# Patient Record
Sex: FEMALE | Race: WHITE | Employment: UNEMPLOYED | URBAN - METROPOLITAN AREA
[De-identification: names, ages, dates, MRNs, and addresses within clinical notes are randomized per-mention and may not be internally consistent; named-entity substitution may affect disease eponyms.]

---
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
## 2024-01-31 ENCOUNTER — HOSPITAL ENCOUNTER (EMERGENCY)
Age: 19
Discharge: HOME | End: 2024-01-31
Attending: EMERGENCY MEDICINE
Payer: COMMERCIAL

## 2024-01-31 ENCOUNTER — APPOINTMENT (OUTPATIENT)
Dept: GENERAL RADIOLOGY | Age: 19
End: 2024-01-31
Payer: COMMERCIAL

## 2024-01-31 VITALS
TEMPERATURE: 98.3 F | HEART RATE: 93 BPM | RESPIRATION RATE: 16 BRPM | SYSTOLIC BLOOD PRESSURE: 126 MMHG | BODY MASS INDEX: 23.37 KG/M2 | OXYGEN SATURATION: 98 % | DIASTOLIC BLOOD PRESSURE: 75 MMHG | WEIGHT: 127 LBS | HEIGHT: 62 IN

## 2024-01-31 DIAGNOSIS — S60.221A CONTUSION OF RIGHT HAND, INITIAL ENCOUNTER: Primary | ICD-10-CM

## 2024-01-31 PROCEDURE — 73130 X-RAY EXAM OF HAND: CPT

## 2024-01-31 PROCEDURE — 99283 EMERGENCY DEPT VISIT LOW MDM: CPT

## 2024-01-31 ASSESSMENT — PAIN DESCRIPTION - LOCATION: LOCATION: HAND

## 2024-01-31 ASSESSMENT — PAIN SCALES - GENERAL: PAINLEVEL_OUTOF10: 6

## 2024-01-31 ASSESSMENT — PAIN - FUNCTIONAL ASSESSMENT: PAIN_FUNCTIONAL_ASSESSMENT: 0-10

## 2024-01-31 ASSESSMENT — PAIN DESCRIPTION - ORIENTATION: ORIENTATION: RIGHT

## 2024-01-31 NOTE — DISCHARGE INSTRUCTIONS
Please go to [employer] or call [phone] to find a new provider.     Or use QR code below:

## 2024-01-31 NOTE — ED PROVIDER NOTES
Triage Chief Complaint:    Hand Pain (R sided x1 week. NKI. States it being broke before and cast was removed in December )    LINDA Moreau is a 18 y.o. female that presents for evaluation of right hand pain.  The patient had prior injury.  Patient reports that she has been bumping it over the last week.  Patient had not noticed any numbness tingling or weakness.  No swelling.  No skin changes.  Has not tried thing it for symptom management.  Feels otherwise well.  No other acute concerns today.    History from : Patient    Limitations to history : None    ROS:  10 systems reviewed and negative except as above.     History reviewed. No pertinent past medical history.  History reviewed. No pertinent surgical history.  History reviewed. No pertinent family history.  Social History     Socioeconomic History    Marital status: Single     Spouse name: Not on file    Number of children: Not on file    Years of education: Not on file    Highest education level: Not on file   Occupational History    Not on file   Tobacco Use    Smoking status: Never    Smokeless tobacco: Not on file   Vaping Use    Vaping Use: Every day   Substance and Sexual Activity    Alcohol use: No    Drug use: Not Currently     Frequency: 1.0 times per week     Types: Marijuana (Weed)    Sexual activity: Not on file   Other Topics Concern    Not on file   Social History Narrative    Not on file     Social Determinants of Health     Financial Resource Strain: Not on file   Food Insecurity: Not on file   Transportation Needs: Not on file   Physical Activity: Not on file   Stress: Not on file   Social Connections: Not on file   Intimate Partner Violence: Not on file   Housing Stability: Not on file     No current facility-administered medications for this encounter.     Current Outpatient Medications   Medication Sig Dispense Refill    diclofenac (VOLTAREN) 75 MG EC tablet Take 1 tablet by mouth 2 times daily as needed for Pain 20 tablet 0

## 2024-11-11 ENCOUNTER — HOSPITAL ENCOUNTER (OUTPATIENT)
Dept: PSYCHIATRY | Age: 19
Setting detail: THERAPIES SERIES
Discharge: HOME | End: 2024-11-11
Payer: COMMERCIAL

## 2024-11-11 PROCEDURE — 90791 PSYCH DIAGNOSTIC EVALUATION: CPT

## 2024-11-11 PROCEDURE — 80305 DRUG TEST PRSMV DIR OPT OBS: CPT

## 2024-11-11 ASSESSMENT — PATIENT HEALTH QUESTIONNAIRE - PHQ9
2. FEELING DOWN, DEPRESSED OR HOPELESS: NOT AT ALL
1. LITTLE INTEREST OR PLEASURE IN DOING THINGS: NOT AT ALL
SUM OF ALL RESPONSES TO PHQ QUESTIONS 1-9: 0
SUM OF ALL RESPONSES TO PHQ9 QUESTIONS 1 & 2: 0
SUM OF ALL RESPONSES TO PHQ QUESTIONS 1-9: 0

## 2024-11-11 ASSESSMENT — ANXIETY QUESTIONNAIRES
1. FEELING NERVOUS, ANXIOUS, OR ON EDGE: NOT AT ALL
2. NOT BEING ABLE TO STOP OR CONTROL WORRYING: NOT AT ALL
GAD7 TOTAL SCORE: 0
6. BECOMING EASILY ANNOYED OR IRRITABLE: NOT AT ALL
7. FEELING AFRAID AS IF SOMETHING AWFUL MIGHT HAPPEN: NOT AT ALL
IF YOU CHECKED OFF ANY PROBLEMS ON THIS QUESTIONNAIRE, HOW DIFFICULT HAVE THESE PROBLEMS MADE IT FOR YOU TO DO YOUR WORK, TAKE CARE OF THINGS AT HOME, OR GET ALONG WITH OTHER PEOPLE: NOT DIFFICULT AT ALL
3. WORRYING TOO MUCH ABOUT DIFFERENT THINGS: NOT AT ALL
5. BEING SO RESTLESS THAT IT IS HARD TO SIT STILL: NOT AT ALL
4. TROUBLE RELAXING: NOT AT ALL

## 2024-11-11 NOTE — PROGRESS NOTES
[facility]     PHYSICIAN ORDER  &  LABORATORY TESTING  &       CLINICAL DIAGNOSTIC SUMMARY             Location: [x] [place] [] [place]                   Patient Name: Roxana Moreau   : 2005     Case # :  [id number]  Therapist: INGA LiraIII    Diagnostic Summary  F10.99 Unspecified alcohol-related disorder  F12.10 Nondependent cannabis abuse-unspecified use    Alcohol- 18 Client reports she drank to get drunk 1 time a month for 6 months. She reports she would drink 4-5 shots of liquor. Client reports last use of alcohol May 2023 4-5 shots    Cannabis- 18 Client reports she smokes 1 blunt a day until she quit in 2024. Client reports last use of cannabis 2024 1 blunt    PROBLEM STATEMENT: Client is on probation for attempting to obstruct charge.         IDENTIFYING INFORMATION:  Roxana Moreau / 2005         Client is a 18 year old  female on probation for an attempting to obstruct charge. She found out in 2024 she had a warrent out for aggravated robbery, felonious assault and obstructing official business. She turned herself in March and spent 47 days in care home. After court all charges were dropped down to attempting to obstruct official business. Client is single, employed full time at [employer] and no kids. She is living with her aunt and uncle and their children. Client reports she is not on good terms with dad and has not talked to him in a year. She reports her relationship with mom is getting better. She has not lived with her mom since age 10 due to her mom getting a child endagering charge. At that time whe went and lived with dad.     2.  IMPAIRED CONTROL :     Cannabis- using longer than intended, craving    3. SOCIAL IMPAIRMENT:     NA       4. RISKY USE:      NA    5. PHARMACOLOGIC DEPENDENCE:      NA    6. OTHER FACTORS:    Client has family history both sides  Client doesn't have PCP  Client has strained relationship with

## 2024-11-11 NOTE — PROGRESS NOTES
[facility]        Individual  Progress Note    Location: [x] [place] [] [place]                   Patient Name: Roxana Moreau   : 2005     Case # :  [id number]  Therapist: CASSIE Lira        Objective/Service/Time: kept 1 hour assessment    S-Client is a 18 year old  female on probation for an attempting to obstruct charge. She found out in 2024 she had a warrent out for aggravated robbery, felonious assault and obstructing official business. She turned herself in March and spent 47 days in [facility]. After court all charges were dropped down to attempting to obstruct official business. Client is single, employed full time at [employer] and no kids. She is living with her aunt and uncle and their children. Client reports she is not on good terms with dad and has not talked to him in a year. She reports her relationship with mom is getting better. She has not lived with her mom since age 10 due to her mom getting a child endagering charge. At that time whe went and lived with dad.     O-Client was cooperative, her thought process was logical, her mood euthymic, her affect full and speech normal. Client denies any hallucinations or delusions. No HI/SI.    A-Completed intake paperwork, began assessment and administered initial UDS. Client met criteria for level 1 treatment. Client chose [facility].     P-Client will return 24 to complete assessment.         Electronically signed by CASSIE Lira on 2024 at 10:28 AM

## 2024-11-19 ENCOUNTER — HOSPITAL ENCOUNTER (OUTPATIENT)
Dept: PSYCHIATRY | Age: 19
Setting detail: THERAPIES SERIES
Discharge: HOME | End: 2024-11-19
Payer: COMMERCIAL

## 2024-11-19 PROCEDURE — 90834 PSYTX W PT 45 MINUTES: CPT

## 2024-11-19 NOTE — PROGRESS NOTES
[facility]      Location: [x] [place] [] [place]    Treatment plan: Initial    Strengths: singing and art     Weakness/Limitations: impulsive     Service/Frequency/Duration: Individual 1 a week for 90 days , Group assigned 1 a week for 90 days , Urinalysis Random monthly for 90 days  , AA/NA 1-2 Biweekly for 90 days , and Case Management as needed     Diagnosis: F12.10 Nondependent cannabis abuse-unspecified use and F10.99 Unspecified alcohol-related disorder    Level of Care: 1 Outpatient Services      Problem: History of Substance use resulting in an obstruction charge.     Goal: Client will enhance personalized knowledge and insight associated with mood altering substances in 90 days   Objectives:   1) Client will remind herself of 1-2 detrimental consequences in major life areas regarding AoD use in 90 days Evaluation Date: 2/19/2025 Code: C Continue TBD     2) Client will identify 4 to 8 benefits and gratitude's due to remaining substance free in 90 days: Evaluation Date: 2/19/2025 Code: C Continue TBD     3) Client will identify 3-5 people, places and situations that trigger alcohol use in 90 days and Evaluation Date: 2/19/2025 Code: C Continue TBD     2.    Problem: Low Self-Esteem/Identify issues as a result of her self-medicating.     Goal: Client will learn to focus on her character strengths and feel better about herself in 90 days      Objectives:   1) Client will identify 3-5 times weekly thoughts and feelings and share how this aids recovery in her individual sessions in 90 days: Evaluation Date: 2/19/2025 Code: C Continue TBD      2) Client will identify, challenge, and replace destructive, high risk self-talk with positive strength building self-talk in 90 days. Evaluation Date: 2/19/2025 Code: C Continue TBD      3)  Utilize, if needed case management services provided by [facility]a to enhance abstaining from substance use Evaluation Date: 2/19/2025 Code: C Continue TBD     4)

## 2024-11-19 NOTE — PROGRESS NOTES
[facility]        Individual  Progress Note    Location: [x] [place] [] [place]                   Patient Name: Roxana Moreau   : 2005     Case # :  [id number]  Therapist: CASSIE Lira        Objective/Service/Time: kept 1 hour individual     S-Client is a 18 year old  female on probation for an attempting to obstruct charge. She found out in 2024 she had a warrent out for aggravated robbery, felonious assault and obstructing official business. She turned herself in March and spent 47 days in long term. After court all charges were dropped down to attempting to obstruct official business. Client is single, employed full time at [employer] and no kids. She reports she is looking for a job in [place] currently. She is living with her aunt and uncle and their children. Client reports she is not on good terms with dad and has not talked to him in a year. She reports her relationship with mom is getting better. She has not lived with her mom since age 10 due to her mom getting a child endagering charge. At that time whe went and lived with dad.     O-Client was pleasant, cooperative and oriented x 4.     A-Completed assessment, reviewed last UDS that was negative for all substances and created a treatment plan. Client will start group tomorrow at 4:00 PM.    P-Continue services.       Electronically signed by CASSIE Lira on 2024 at 10:28 AM

## 2024-11-20 ENCOUNTER — HOSPITAL ENCOUNTER (OUTPATIENT)
Dept: PSYCHIATRY | Age: 19
Setting detail: THERAPIES SERIES
Discharge: HOME | End: 2024-11-20
Payer: COMMERCIAL

## 2024-11-20 PROCEDURE — 90853 GROUP PSYCHOTHERAPY: CPT

## 2024-11-20 NOTE — GROUP NOTE
[facility] Group Therapy Note      11/20/2024    Location:  [place]      Clients Presents: 1711, 1725, 1343, 1704, 1668    Clients Absent: 1726    Length of session: 1.5 hours    Group Note: OP    Group Type: Women's    New members were welcomed and introduced.  Norms and expectations of group were discussed.    Content: Counselor presented a topic focused discussion on \"chemical dependency symptoms\". Client answered several questions with a yes/no. Client identified symptoms of addiction and shared how her substance use progressed.    CASSIE Lira  11/20/2024 5:30 PM    Co-Therapist: N/A      [facility] Individual Group Progress Note    Roxana Moreau  2005 11/20/2024    Notes on Client Progress in Group    Client shared this is her first group and she is anxious. Client denies any use or cravings.   Client participated in group discussion on chemical dependency questionnaire and gave appropriate feedback to group members.    CASSIE Lira  11/20/2024 5:25 PM    Co-Therapist: N/A

## 2024-11-25 ENCOUNTER — HOSPITAL ENCOUNTER (OUTPATIENT)
Dept: PSYCHIATRY | Age: 19
Setting detail: THERAPIES SERIES
Discharge: HOME | End: 2024-11-25
Payer: COMMERCIAL

## 2024-11-25 PROCEDURE — 90832 PSYTX W PT 30 MINUTES: CPT

## 2024-11-25 NOTE — PROGRESS NOTES
Documentation:  I communicated with the patient and/or health care decision maker about progress in treatment .   Details of this discussion including any medical advice provided: coping skills to manage anxiety and sadness.     Total Time: minutes: 21-30 minutes    Roxana Moreau was evaluated through a synchronous (real-time) audio encounter. Patient identification was verified at the start of the visit. She (or guardian if applicable) is aware that this is a billable service, which includes applicable co-pays. This visit was conducted with the patient's (and/or legal guardian's) verbal consent. She has not had a related appointment within my department in the past 7 days or scheduled within the next 24 hours.   The patient was located at Home: [address] [place] [address].  The provider was located at Facility (Appt Dept): [facility] [address] [place] [address].  Confirm you are appropriately licensed, registered, or certified to deliver care in the state where the patient is located as indicated above. If you are not or unsure, please re-schedule the visit: Yes, I confirm.     Note: not billable if this call serves to triage the patient into an appointment for the relevant concern    Roxana Moreau is a 18 y.o. female evaluated via telephone on 11/25/2024 for No chief complaint on file.  .        Qing Escalante, [facility]

## 2024-11-25 NOTE — PROGRESS NOTES
[facility]        Individual  Progress Note    Location: [x] [place] [] [place]                   Patient Name: Roxana Moreau   : 2005     Case # :  [id number]  Therapist: CASSIE Lira        Objective/Service/Time: kept 1/2 hour telehealth session    Goal 1 Objective 2 continue  Goal 2 Objective 1 continue    S-Client is a 18 year old  female on probation. Client denies any use or cravings. She reports she thought her appointment was on Tuesday instead of Monday. She agreed to telehealth this morning. Client shared she has had 2 job interviews and is filling out applications online daily looking for a [employer] job. Client denies any current obstacles or stressors. She stated, \"I am nervous about working but I am ready. I am feeling anxious about seeing my family at [facility]. It will be good to see everyone that I haven't seen in a few years. She shared she is a little sad her brother is moving in a few days to [place].    O-Client was cooperative , pleasant and oriented x 4.     A-Client has some insight into her AoD use and consequences. She has small support network. Client has been encouraged to journal and build sober support.     P-Continue services.         Electronically signed by CASSIE Lira on 2024 at 10:21 AM

## 2024-11-25 NOTE — PROGRESS NOTES
[facility] TREATMENT PLAN      Location: [x] [place] [] [place]    Treatment plan: Initial    Strengths: singing and art     Weakness/Limitations: impulsive     Service/Frequency/Duration: Individual 1 a week for 90 days , Group assigned 1 a week for 90 days , Urinalysis Random monthly for 90 days  , AA/NA 1-2 Biweekly for 90 days , and Case Management as needed     Diagnosis: F12.10 Nondependent cannabis abuse-unspecified use and F10.99 Unspecified alcohol-related disorder    Level of Care: 1 Outpatient Services      Problem: History of Substance use resulting in an obstruction charge.     Goal: Client will enhance personalized knowledge and insight associated with mood altering substances in 90 days   Objectives:   1) Client will remind herself of 1-2 detrimental consequences in major life areas regarding AoD use in 90 days Evaluation Date: 2/19/2025 Code: C Continue TBD     2) Client will identify 4 to 8 benefits and gratitude's due to remaining substance free in 90 days: Evaluation Date: 2/19/2025 Code: Continue 11/25/2024 Client is grateful to be spending time with family this Holiday. She reports she hasn't seen everyone in a couple of years.     3) Client will identify 3-5 people, places and situations that trigger alcohol use in 90 days and Evaluation Date: 2/19/2025 Code: C Continue TBD     2.    Problem: Low Self-Esteem/Identify issues as a result of her self-medicating.     Goal: Client will learn to focus on her character strengths and feel better about herself in 90 days      Objectives:   1) Client will identify 3-5 times weekly thoughts and feelings and share how this aids recovery in her individual sessions in 90 days: Evaluation Date: 2/19/2025 Code: Continue 11/25/2024 She reports she has had 2 job interviews and has been filling out applications for a new job. Client is nervous but ready to work.     2) Client will identify, challenge, and replace destructive, high risk self-talk with positive

## 2024-11-27 ENCOUNTER — HOSPITAL ENCOUNTER (OUTPATIENT)
Dept: PSYCHIATRY | Age: 19
Setting detail: THERAPIES SERIES
Discharge: HOME | End: 2024-11-27
Payer: COMMERCIAL

## 2024-11-27 PROCEDURE — 90853 GROUP PSYCHOTHERAPY: CPT

## 2024-11-27 NOTE — GROUP NOTE
[facility] Group Therapy Note      11/27/2024    Location:  [place]      Clients Presents: 1704, 1725, 1668    Clients Absent: 1711, 1343    Length of session: 1.5 hours    Group Note: OP    Group Type: Women's    New members were welcomed and introduced.  Norms and expectations of group were discussed.    Content: Counselor presented a topic focused discussion on setting goals. Client participated in making a vision board with specific attainable goals.     INGA LiraIII  11/27/2024 5:30 PM    Co-Therapist: N/A      [facility] Individual Group Progress Note    Roxana Moreau  2005 11/27/2024    Notes on Client Progress in Group    Client shared she is making progress on her goals in treatment. She denies any use or cravings.   Client participated in group discussion on setting goals. Client participated in making her vision board.    INGA LiraIII  11/27/2024 5:21 PM    Co-Therapist: N/A

## 2024-12-02 ENCOUNTER — HOSPITAL ENCOUNTER (OUTPATIENT)
Dept: PSYCHIATRY | Age: 19
Setting detail: THERAPIES SERIES
Discharge: HOME | End: 2024-12-02
Payer: COMMERCIAL

## 2024-12-02 PROCEDURE — 80305 DRUG TEST PRSMV DIR OPT OBS: CPT

## 2024-12-02 PROCEDURE — 90834 PSYTX W PT 45 MINUTES: CPT
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
## 2024-12-04 ENCOUNTER — HOSPITAL ENCOUNTER (OUTPATIENT)
Dept: PSYCHIATRY | Age: 19
Setting detail: THERAPIES SERIES
Discharge: HOME | End: 2024-12-04
Payer: COMMERCIAL

## 2024-12-04 PROCEDURE — 90853 GROUP PSYCHOTHERAPY: CPT

## 2024-12-04 NOTE — GROUP NOTE
[facility] Group Therapy Note      12/4/2024    Location:  [place]      Clients Presents: 1704, 1725, 1711, 1668    Clients Absent: 1343, 1727    Length of session: 1.5 hours    Group Note: OP    Group Type: Women's    New members were welcomed and introduced.  Norms and expectations of group were discussed.    Content: Counselor presented a topic focused discussion on \"grieving the loss of addiction\".     INGA LiraIII  12/4/2024 5:30 PM    Co-Therapist: N/A      [facility] Individual Group Progress Note    Roxana Moreau  2005 12/4/2024    Notes on Client Progress in Group    Client shared she is making progress on her goals in treatment. She denies any use or cravings. She reports her boyfriends ex girl is causing issues and it is stressful.   Client participated in group discussion on grieving the loss of addiction. She offered peers appropriate feedback and support.     INGA LiraIII  12/4/2024 5:25 PM    Co-Therapist: N/A

## 2024-12-05 ENCOUNTER — HOSPITAL ENCOUNTER (EMERGENCY)
Age: 19
Discharge: HOME | End: 2024-12-05
Payer: COMMERCIAL

## 2024-12-05 VITALS
BODY MASS INDEX: 23 KG/M2 | TEMPERATURE: 98 F | HEART RATE: 92 BPM | DIASTOLIC BLOOD PRESSURE: 90 MMHG | WEIGHT: 125 LBS | HEIGHT: 62 IN | RESPIRATION RATE: 17 BRPM | OXYGEN SATURATION: 97 % | SYSTOLIC BLOOD PRESSURE: 138 MMHG

## 2024-12-05 DIAGNOSIS — R11.0 NAUSEA: ICD-10-CM

## 2024-12-05 DIAGNOSIS — Z32.02 PREGNANCY EXAMINATION OR TEST, NEGATIVE RESULT: Primary | ICD-10-CM

## 2024-12-05 LAB
BILIRUB UR QL STRIP: NEGATIVE
CLARITY UR: CLEAR
COLOR UR: YELLOW
COMMENT: NORMAL
GLUCOSE UR STRIP-MCNC: NEGATIVE MG/DL
HCG UR QL: NEGATIVE
HGB UR QL STRIP.AUTO: NEGATIVE
KETONES UR STRIP-MCNC: NEGATIVE MG/DL
LEUKOCYTE ESTERASE UR QL STRIP: NEGATIVE
NITRITE UR QL STRIP: NEGATIVE
PH UR STRIP: 5.5 [PH] (ref 5–8)
PROT UR STRIP-MCNC: NEGATIVE MG/DL
SP GR UR STRIP: 1.02 (ref 1–1.03)
UROBILINOGEN UR STRIP-ACNC: 0.2 EU/DL (ref 0–1)

## 2024-12-05 PROCEDURE — 81003 URINALYSIS AUTO W/O SCOPE: CPT

## 2024-12-05 PROCEDURE — 81025 URINE PREGNANCY TEST: CPT

## 2024-12-05 PROCEDURE — 84703 CHORIONIC GONADOTROPIN ASSAY: CPT

## 2024-12-05 PROCEDURE — 99283 EMERGENCY DEPT VISIT LOW MDM: CPT

## 2024-12-05 ASSESSMENT — PAIN SCALES - GENERAL: PAINLEVEL_OUTOF10: 5

## 2024-12-05 ASSESSMENT — PAIN DESCRIPTION - ORIENTATION: ORIENTATION: LOWER

## 2024-12-05 ASSESSMENT — PAIN - FUNCTIONAL ASSESSMENT: PAIN_FUNCTIONAL_ASSESSMENT: 0-10

## 2024-12-05 ASSESSMENT — LIFESTYLE VARIABLES
HOW OFTEN DO YOU HAVE A DRINK CONTAINING ALCOHOL: NEVER
HOW MANY STANDARD DRINKS CONTAINING ALCOHOL DO YOU HAVE ON A TYPICAL DAY: PATIENT DOES NOT DRINK

## 2024-12-05 ASSESSMENT — PAIN DESCRIPTION - DESCRIPTORS: DESCRIPTORS: ACHING

## 2024-12-05 ASSESSMENT — PAIN DESCRIPTION - LOCATION: LOCATION: ABDOMEN

## 2024-12-05 NOTE — DISCHARGE INSTRUCTIONS
It was my pleasure taking care of you in the emergency department today.  If we prescribed any medications, please be sure to take them as prescribed. Continue taking medications as prescribed by your PCP unless we discussed otherwise.   Please be sure to follow-up with your PCP within the next 1-2 weeks.  Please don't hesitate to return to the emergency department in case of any worsening symptoms.  Wish you a speedy recovery.  Most sincerely,    Maryana Echevarria CNP

## 2024-12-05 NOTE — ED PROVIDER NOTES
no right CVA tenderness, left CVA tenderness, guarding or rebound. Negative signs include Barney's sign, Rovsing's sign, McBurney's sign, psoas sign and obturator sign.      Hernia: No hernia is present.   Musculoskeletal:         General: Normal range of motion.      Cervical back: Normal range of motion. No rigidity.      Right lower leg: No edema.      Left lower leg: No edema.   Skin:     General: Skin is warm and dry.      Capillary Refill: Capillary refill takes less than 2 seconds.   Neurological:      General: No focal deficit present.      Mental Status: She is alert and oriented to person, place, and time.   Psychiatric:         Mood and Affect: Mood normal.         Behavior: Behavior normal.         Thought Content: Thought content normal.         Judgment: Judgment normal.           DIAGNOSTIC RESULTS   LABS:    Labs Reviewed   URINALYSIS WITH REFLEX TO CULTURE   PREGNANCY, URINE   CBC WITH AUTO DIFFERENTIAL   BASIC METABOLIC PANEL   LIPASE       When ordered only abnormal lab results are displayed. All other labs were within normal range or not returned as of this dictation.    EMERGENCY DEPARTMENT COURSE and DIFFERENTIAL DIAGNOSIS/MDM:   Vitals:    Vitals:    12/05/24 1111 12/05/24 1115   BP: (!) 138/90    Pulse: 92    Resp: 17    Temp: 98 °F (36.7 °C)    SpO2: 97%    Weight:  56.7 kg (125 lb)   Height:  1.575 m (5' 2\")       Patient was given the following medications:  Medications - No data to display        Chronic Conditions affecting care:    has no past medical history on file.    CONSULTS: (Who and What was discussed)  None      Records Reviewed (External and Source) seen on 11/3/2024 for STD exposure provided with Valtrex doxycycline patient was negative for trichomonas chlamydia gonorrhea herpes negative was also provided with Rocephin injection    CC/HPI Summary, DDx, ED Course, and Reassessment: See HPI and physical above    Patient presenting with concern for pregnancy, generalized

## 2024-12-05 NOTE — ED TRIAGE NOTES
Pt arrived to ED for abdominal pain that started a few days ago. Pt explained she had heavy spotting four days ago. Pt explained she wants a pregnancy test. Pt Is a/ox3 and ambulatory.

## 2024-12-06 LAB — HCG, PREGNANCY URINE (POC): NEGATIVE

## 2024-12-09 ENCOUNTER — HOSPITAL ENCOUNTER (OUTPATIENT)
Dept: PSYCHIATRY | Age: 19
Setting detail: THERAPIES SERIES
Discharge: HOME | End: 2024-12-09
Payer: COMMERCIAL

## 2024-12-09 PROCEDURE — 90832 PSYTX W PT 30 MINUTES: CPT
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
## 2024-12-09 NOTE — PROGRESS NOTES
[facility]        Individual  Progress Note    Location: [x] [place] [] [place]                   Patient Name: Roxana Moreau   : 2005     Case # :  [id number]  Therapist: CASSIE Lira        Objective/Service/Time: kept 1/2 hour telehealth    Goal 1 Objective 3 achieved    S-Client is a 18 year old  female on probation. Client denies any use or cravings. She reports she has been sick on and off and went to the ER 2024 and had blood taken and was there for 5 hours. She stated, \"I didn't get any answers. They actually lost my blood so after 5 hours they came back in and wanted to take it again and when I asked how long it would take to get results they said probably 5 hours? I left. I was tired and it was ridiculous\". Client and counselor explored triggers. She shared, old friends have been blocked, she stays away from apartment where she used to party at, she doesn't go to [place] due to trauma memories and she is triggered  by grief/loss.     O-Client was cooperative, worried AEB self report and oriented x 4. She shared openly about being worried about her health.    A-Client has good insight into her AoD use and triggers. She has very small support network. Client has been encouraged to attend women's meetings to build support. She is looking for a job.     P-Continue services, find employment and build sober support.         Electronically signed by CASSIE Lira on 2024 at 10:25 AM

## 2024-12-09 NOTE — PROGRESS NOTES
Documentation:  I communicated with the patient and/or health care decision maker about triggers.   Details of this discussion including any medical advice provided: see note    Total Time: minutes: 21-30 minutes    Roxana Moreau was evaluated through a synchronous (real-time) audio encounter. Patient identification was verified at the start of the visit. She (or guardian if applicable) is aware that this is a billable service, which includes applicable co-pays. This visit was conducted with the patient's (and/or legal guardian's) verbal consent. She has not had a related appointment within my department in the past 7 days or scheduled within the next 24 hours.   The patient was located at Home: [address] [place] [address].  The provider was located at Facility (Appt Dept): [place] [address].  Confirm you are appropriately licensed, registered, or certified to deliver care in the state where the patient is located as indicated above. If you are not or unsure, please re-schedule the visit: Yes, I confirm.     Note: not billable if this call serves to triage the patient into an appointment for the relevant concern    Roxana Moreau is a 18 y.o. female evaluated via telephone on 12/9/2024 for No chief complaint on file.  .        Qing Escalante, DCIII

## 2024-12-11 ENCOUNTER — HOSPITAL ENCOUNTER (OUTPATIENT)
Dept: PSYCHIATRY | Age: 19
Setting detail: THERAPIES SERIES
Discharge: HOME | End: 2024-12-11
Payer: COMMERCIAL

## 2024-12-11 PROCEDURE — 90853 GROUP PSYCHOTHERAPY: CPT

## 2024-12-11 NOTE — GROUP NOTE
[facility] Group Therapy Note      12/11/2024    Location:  [place]      Clients Presents: 1704, 1343, 1725, 1711, 1668    Clients Absent:     Length of session: 1.5 hours    Group Note: OP    Group Type: Women's    New members were welcomed and introduced.  Norms and expectations of group were discussed.    Content: Counselor facilitated an open discussion on being smart not strong. Client rated (1 being poor and 4 being excellent) her progress in 11 different areas of recovery.     CASSIE Lira  12/11/2024 5:30 PM    Co-Therapist: N/A      [facility] Individual Group Progress Note    Roxana Moreau  2005 12/11/2024    Notes on Client Progress in Group    Client shared she is making progress on her goals in treatment and is remaining sober. Client shared she is stressed about her relationship.   Client participated in group discussion reporting out of a possible 44 points she scored 40.     INGA LiraIII  12/11/2024 5:31 PM    Co-Therapist: N/A

## 2024-12-16 ENCOUNTER — HOSPITAL ENCOUNTER (OUTPATIENT)
Dept: PSYCHIATRY | Age: 19
Setting detail: THERAPIES SERIES
Discharge: HOME | End: 2024-12-16
Payer: COMMERCIAL

## 2024-12-16 NOTE — PROGRESS NOTES
[facility]        Individual  Progress Note    Location: [x] [place] [] [place]                   Patient Name: Roxana Moreau   : 2005     Case # :  [id number]  Therapist: CASSIE Lira        Objective/Service/Time:     Client did not show for her session. Counselor called client, no one answered. Client was sent a letter of intent.         Electronically signed by CASSIE Lira on 2024 at 10:10 AM

## 2024-12-17 ENCOUNTER — HOSPITAL ENCOUNTER (OUTPATIENT)
Dept: PSYCHIATRY | Age: 19
Setting detail: THERAPIES SERIES
Discharge: HOME | End: 2024-12-17
Payer: COMMERCIAL

## 2024-12-17 PROCEDURE — 90834 PSYTX W PT 45 MINUTES: CPT | Performed by: NURSE PRACTITIONER
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
## 2024-12-18 ENCOUNTER — HOSPITAL ENCOUNTER (OUTPATIENT)
Dept: PSYCHIATRY | Age: 19
Setting detail: THERAPIES SERIES
Discharge: HOME | End: 2024-12-18
Payer: COMMERCIAL

## 2024-12-18 PROCEDURE — 90853 GROUP PSYCHOTHERAPY: CPT

## 2024-12-19 NOTE — GROUP NOTE
[facility] Therapy Note      12/18/2024    Location:  [place]      Clients Presents: 1704, 1343, 1725, 1711, 1668    Clients Absent:     Length of session: 1.5 hours    Group Note: OP    Group Type: Women's    New members were welcomed and introduced.  Norms and expectations of group were discussed.    Content: Counselor facilitated an Addiction facts BINGO game. Client participated in playing BINGO and provided appropriate feedback and support to her peers.     CASSIE Lira  12/18/2024 5:30 PM    Co-Therapist: N/A      [facility] Individual Group Progress Note    Roxana Moreau  2005 12/19/2024    Notes on Client Progress in Group    Client shared she is making progress on her goals in treatment. She denies any use or cravings.   Client participated in playing Addiction BINGO and providing appropriate feedback and support to her peers.    CASSIE Lira  12/19/2024 8:58 AM    Co-Therapist: N/A

## 2024-12-23 ENCOUNTER — HOSPITAL ENCOUNTER (OUTPATIENT)
Dept: PSYCHIATRY | Age: 19
Setting detail: THERAPIES SERIES
Discharge: HOME | End: 2024-12-23
Payer: COMMERCIAL

## 2024-12-23 PROCEDURE — 90834 PSYTX W PT 45 MINUTES: CPT

## 2024-12-23 NOTE — PROGRESS NOTES
[facility]        Individual  Progress Note    Location: [x] [place] [] [place]                   Patient Name: Roxana Moreau   : 2005     Case # :  [id number]  Therapist: CASSIE Lira        Objective/Service/Time: kept 1 hour     Goal 1 Objective 2 continue  Goal 2 Objective 2 continue  Goal 2 Objective 4 achieved    S-Client is a 19 year old  female on probation. Client denies any use or cravings. She reports she is still looking for a job and is waiting to hear back from [employer]. Client denies any current obstacles or stressors. She reports she is grateful for her birthday today, her home and to not be where she was a year ago. Client shared she is still saying positive affirmations daily and feeling \"stronger\". She shared she has set boundaries with her uncle, Robyn and herself.     O-Client was pleasant, cooperative and oriented x 4.    A-Client has good insight into her AoD use and consequences. She is in the action stage of change. She has small family support. Counselor has encouraged 12 step meetings to build support.     P-Continue services and build support.             Electronically signed by CASSIE Lira on 2024 at 10:55 AM   
and share how this aids recovery in her individual sessions in 90 days: Evaluation Date: 2/19/2025 Code: Continue 11/25/2024 She reports she has had 2 job interviews and has been filling out applications for a new job. Client is nervous but ready to work.     2) Client will identify, challenge, and replace destructive, high risk self-talk with positive strength building self-talk in 90 days. Evaluation Date: 2/19/2025 Code: Continue 12/23/2024 Client reports saying, \"I am worthy, smart and courageous.      3)  Utilize, if needed case management services provided by [employer] to enhance abstaining from substance use Evaluation Date: 2/19/2025 Code: C Continue TBD     4) Client will identify people she needs to set healthy boundaries with and practice saying no, 1 time a week in 90 days. Evaluation Date: 2/19/2025 Code: Achieved 12/23/2024 Client reports she has set boundaries with her uncle, her friend Robyn and herself.           3.    Problem: History of Relapse resulting in emotional upset with self and family relationships.     Goal: Client will identify and address the core dynamics and dilemmas that are perpetuating consequences and exacerbate relapses and triggers and in 90 days      b.  Objectives:   1)  Client will identify 3-5 sober support person to contact weekly to share her thoughts and feelings to avoid relapse in 90 days Evaluation Date: 2/19/2025 Code: C Continue TBD     2) Client will enhance 4 to 8 healthy techniques and coping skills, relapse prevention in 90 days Evaluation Date: 2/19/2025 Code: C Continue TBD    3) Identify 4-5 situations that produce anxiety associated with substance use weekly, in 90 days and Evaluation Date: 2/19/2025 Code: C Continue TBD    Defer: Client reports she would like to have her own place.     Discharge Plan/Instructions: Client will remain abstinent from all mood altering substaces and complete her goals and objectives.     Roxana Moreau / 2005 has

## 2024-12-31 ENCOUNTER — HOSPITAL ENCOUNTER (OUTPATIENT)
Dept: PSYCHIATRY | Age: 19
Setting detail: THERAPIES SERIES
Discharge: HOME | End: 2024-12-31
Payer: COMMERCIAL

## 2024-12-31 PROCEDURE — 80305 DRUG TEST PRSMV DIR OPT OBS: CPT

## 2024-12-31 PROCEDURE — 90834 PSYTX W PT 45 MINUTES: CPT

## 2024-12-31 NOTE — PROGRESS NOTES
[facility]        Individual  Progress Note    Location: [x] [place] [] [place]                   Patient Name: Roxana Moreau   : 2005     Case # :  [id number]  Therapist: CASSIE Lira        Objective/Service/Time: kept 1 hour     Goal 2 Objective 1 continue  Goal 3 Objective 1 continue    S-Client is a 19 year old  female on probation. Client denies any use or cravings. Client shared she is a little tired today reporting she did not sleep well last night. Counselor explored sober support. Client stated, \"If I need to talk I go to my grandma. She is always there for me\". Client shared she had a good [place] and had fun giving her niece her makeup set. Client shared she is still looking for work and working on self care skills. Client denies any current stressors.     O-Client was cooperative, pleasant and oriented x 4.     A-Client has some insight into her AoD use and consequences. She is in the preparation stage of change. Client has small sober support network. Counselor encouraged client to journal more.    P-Continue services.         Electronically signed by CASSIE Lira on 2024 at 11:32 AM   
goals and objectives.     Roxana Moreau / 2005 has participated in the treatment plan development outlined above on 12/31/2024.     Qing Escalante LCDCIII  12/31/2024/11:19 AM

## 2025-01-01 ENCOUNTER — APPOINTMENT (OUTPATIENT)
Dept: PSYCHIATRY | Age: 20
End: 2025-01-01
Payer: COMMERCIAL

## 2025-01-08 ENCOUNTER — HOSPITAL ENCOUNTER (OUTPATIENT)
Dept: PSYCHIATRY | Age: 20
Setting detail: THERAPIES SERIES
Discharge: HOME | End: 2025-01-08
Payer: COMMERCIAL

## 2025-01-08 PROCEDURE — 90853 GROUP PSYCHOTHERAPY: CPT

## 2025-01-08 NOTE — GROUP NOTE
[facility] Group Therapy Note      1/8/2025    Location:  [place]      Clients Presents: 1711, 1704, 1343, 1725    Clients Absent: 1741    Length of session: 1.5 hours    Group Note: OP    Group Type: Women's    New members were welcomed and introduced.  Norms and expectations of group were discussed.    Content: Counselor presented a topic focused discussion on Defense Mechanisms.     INGA LiraIII  1/8/2025 5:30 PM    Co-Therapist: N/A      [facility] Individual Group Progress Note    Roxana Moreau  2005 1/8/2025    Notes on Client Progress in Group    Client shared she is making progress on her goals in treatment. She denies any use or cravings.   Client participated in group discussion on defense mechanisms. She reports she uses magnification and shared journaling is helping her with this.      INGA LiraIII  1/8/2025 5:35 PM    Co-Therapist: N/A

## 2025-01-13 ENCOUNTER — HOSPITAL ENCOUNTER (OUTPATIENT)
Dept: PSYCHIATRY | Age: 20
Setting detail: THERAPIES SERIES
Discharge: HOME | End: 2025-01-13
Payer: COMMERCIAL

## 2025-01-13 PROCEDURE — 90834 PSYTX W PT 45 MINUTES: CPT

## 2025-01-13 NOTE — PROGRESS NOTES
[facility]        Individual  Progress Note    Location: [x] [place] [] [place]                   Patient Name: Roxana Moreau   : 2005     Case # :  [id number]  Therapist: CASSIE Lira        Objective/Service/Time: kept 1 hour     Goal 3 Objective 2 continue    S-Client is a 19 year old  female on probation. Client denies any use or cravings. She shared she is still looking for a job. She plans on going to [employer] and [employer] today. Client shared her coping skills to manage frustration are writing in her journal, listening to music and taking hot showers. Client denies any current stressors.     O-Client was pleasant, cooperative and oriented x 4.     A-Client has good insight into her AoD use and consequences. She has small support network and is willing to build more. Client is in the action stage of change. Counselor has encouraged 12 step meetings.     P-Continue services.         Electronically signed by CASSIE Lira on 2025 at 10:41 AM   
and share how this aids recovery in her individual sessions in 90 days: Evaluation Date: 2/19/2025 Code: Continue 12/31/2024 She reports she is seeing a change in her \"mindset\". She reports she is learning to process her emotions and not shut down.      2) Client will identify, challenge, and replace destructive, high risk self-talk with positive strength building self-talk in 90 days. Evaluation Date: 2/19/2025 Code: Continue 12/23/2024 Client reports saying, \"I am worthy, smart and courageous.      3)  Utilize, if needed case management services provided by [email or web address] to enhance abstaining from substance use Evaluation Date: 2/19/2025 Code: C Continue TBD     4) Client will identify people she needs to set healthy boundaries with and practice saying no, 1 time a week in 90 days. Evaluation Date: 2/19/2025 Code: Achieved 12/23/2024 Client reports she has set boundaries with her uncle, her friend Robyn and herself.           3.    Problem: History of Relapse resulting in emotional upset with self and family relationships.     Goal: Client will identify and address the core dynamics and dilemmas that are perpetuating consequences and exacerbate relapses and triggers and in 90 days      b.  Objectives:   1)  Client will identify 3-5 sober support person to contact weekly to share her thoughts and feelings to avoid relapse in 90 days Evaluation Date: 2/19/2025 Code: Continue 12/31/2024 Client reports her grandma is her main sober support person.      2) Client will enhance 4 to 8 healthy techniques and coping skills, relapse prevention in 90 days Evaluation Date: 2/19/2025 Code: Continue 1/13/2025 Client listens to music, writes in her journal and takes hot showers.     3) Identify 4-5 situations that produce anxiety associated with substance use weekly, in 90 days and Evaluation Date: 2/19/2025 Code: C Continue TBD    Defer: Client reports she would like to have her own place.     Discharge Plan/Instructions:

## 2025-01-15 ENCOUNTER — HOSPITAL ENCOUNTER (OUTPATIENT)
Dept: PSYCHIATRY | Age: 20
Setting detail: THERAPIES SERIES
Discharge: HOME | End: 2025-01-15
Payer: COMMERCIAL

## 2025-01-15 PROCEDURE — 90853 GROUP PSYCHOTHERAPY: CPT

## 2025-01-15 NOTE — GROUP NOTE
[facility] Group Therapy Note      1/15/2025    Location:  [place]      Clients Presents: 1704, 1741, 1343, 1725, 1711    Clients Absent:     Length of session: 1.5 hours    Group Note: OP    Group Type: Women's    New members were welcomed and introduced.  Norms and expectations of group were discussed.    Content: Counselor presented a topic focused discussion on substance abuse. Client edi a question about substance abuse and recovery from the bowl and answered it.    CASSIE Lira  1/15/2025 5:30 PM    Co-Therapist: N/A      [facility] Individual Group Progress Note    Roxana Moreau  2005  1/15/2025    Notes on Client Progress in Group    Client reports she is making progress on her goals in treatment. She denies any use or cravings.   Client participated in group discussion by drawing a question from the bowl and answering it. She gave feedback and support to her peers.      INGA LiraIII  1/15/2025 5:31 PM    Co-Therapist: N/A

## 2025-01-20 ENCOUNTER — HOSPITAL ENCOUNTER (OUTPATIENT)
Dept: PSYCHIATRY | Age: 20
Setting detail: THERAPIES SERIES
Discharge: HOME | End: 2025-01-20
Payer: COMMERCIAL

## 2025-01-20 PROCEDURE — 90834 PSYTX W PT 45 MINUTES: CPT
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
## 2025-01-22 ENCOUNTER — HOSPITAL ENCOUNTER (OUTPATIENT)
Dept: PSYCHIATRY | Age: 20
Setting detail: THERAPIES SERIES
Discharge: HOME | End: 2025-01-22
Payer: COMMERCIAL

## 2025-01-22 PROCEDURE — 90853 GROUP PSYCHOTHERAPY: CPT

## 2025-01-22 NOTE — GROUP NOTE
[facility] Group Therapy Note      1/22/2025    Location:  [place]      Clients Presents: 1704, 1343, 1725, 1711    Clients Absent: 1741    Length of session: 1.5 hours    Group Note: OP    Group Type: Women's    New members were welcomed and introduced.  Norms and expectations of group were discussed.    Content: Counselor presented a topic focused discussion on AoD autobiography's. Client wrote her autobiography and shared with her peers.     INGA LiraIII  1/22/2025 5:30 PM    Co-Therapist: N/A      [facility] Individual Group Progress Note    Roxana Moreau  2005 1/22/2025    Notes on Client Progress in Group    Client shared she is making progress on her treatment goals. She reports she has filled out several applications and is hoping to hear from someone.   Client participated in writing her AoD timeline. She shared it with her peers and offered her peers feedback and support.     INGA LiraIII  1/22/2025 5:30 PM    Co-Therapist: N/A

## 2025-01-27 ENCOUNTER — HOSPITAL ENCOUNTER (OUTPATIENT)
Dept: PSYCHIATRY | Age: 20
Setting detail: THERAPIES SERIES
Discharge: HOME | End: 2025-01-27
Payer: COMMERCIAL

## 2025-01-27 PROCEDURE — 90834 PSYTX W PT 45 MINUTES: CPT

## 2025-01-27 PROCEDURE — 80305 DRUG TEST PRSMV DIR OPT OBS: CPT

## 2025-01-27 SDOH — ECONOMIC STABILITY: FOOD INSECURITY: WITHIN THE PAST 12 MONTHS, YOU WORRIED THAT YOUR FOOD WOULD RUN OUT BEFORE YOU GOT MONEY TO BUY MORE.: SOMETIMES TRUE

## 2025-01-27 SDOH — ECONOMIC STABILITY: FOOD INSECURITY: WITHIN THE PAST 12 MONTHS, THE FOOD YOU BOUGHT JUST DIDN'T LAST AND YOU DIDN'T HAVE MONEY TO GET MORE.: SOMETIMES TRUE

## 2025-01-27 NOTE — PROGRESS NOTES
[facility]        Individual  Progress Note    Location: [x] [place] [] [place]                   Patient Name: Roxana Moreau   : 2005     Case # :  [id number]  Therapist: CASSIE Lira        Objective/Service/Time: kept 1 hour individual     Goal 1 Objective 2 continue    S-Client is a 19 year old  female on probation. Client denies any use or cravings. She shared she is still looking for work and beginning to feel depressed. She shared she is still applying and calling daily. Client denies any current obstacles or stressors. Client stated, \"I am trying to stay positive and fill out all the applications I can and I hope someone will give me a chance\". Client is using her journal to process emotions and keep herself. She rates herself a 4 out of 10 on the depression scale. Client set a goal to attend college in the fall.     O-Client was pleasant, cooperative and oriented c x 4.    A-Client has good insight into her AoD use and consequence.s She has minimal sober support but is open to meeting more friends and has been encouraged to attend 12 step meetings.     P-Client will continue services and reviewed last UDS dated today.
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
## 2025-01-29 ENCOUNTER — HOSPITAL ENCOUNTER (OUTPATIENT)
Dept: PSYCHIATRY | Age: 20
Setting detail: THERAPIES SERIES
Discharge: HOME | End: 2025-01-29
Payer: COMMERCIAL

## 2025-01-29 PROCEDURE — 90853 GROUP PSYCHOTHERAPY: CPT

## 2025-01-29 NOTE — GROUP NOTE
[facility] Group Therapy Note      1/29/2025    Location:  [place]      Clients Presents: 1741, 1343, 1725, 1711    Clients Absent:     Length of session: 1.5 hours    Group Note: OP    Group Type: Women's    New members were welcomed and introduced.  Norms and expectations of group were discussed.    Content: Counselor presented a solution focused discussion on triggers. Client identified internal and external triggers and coping skills to avoid relapse.     INGA LiraIII  1/29/2025 5:30 PM    Co-Therapist: N/A      [facility] Individual Group Progress Note    Roxana Moreau  2005 1/29/2025    Notes on Client Progress in Group    Client shared she is making progress on her goals in treatment. She denies any use or cravings. She reports she had an interview at [employer] and is excited about working.  Client participated in group discussion on triggers. Client identified her triggers and shared on her coping skills to avoid relapse.     INGA LiraIII  1/29/2025 5:27 PM    Co-Therapist: N/A

## 2025-02-02 ENCOUNTER — HOSPITAL ENCOUNTER (EMERGENCY)
Age: 20
Discharge: HOME | End: 2025-02-03
Payer: COMMERCIAL

## 2025-02-02 DIAGNOSIS — R53.81 MALAISE: Primary | ICD-10-CM

## 2025-02-02 LAB
BILIRUB UR QL STRIP: NEGATIVE
CLARITY UR: CLEAR
COLOR UR: YELLOW
EPI CELLS #/AREA URNS HPF: 3 /HPF
GLUCOSE UR STRIP-MCNC: NEGATIVE MG/DL
HGB UR QL STRIP.AUTO: NEGATIVE
KETONES UR STRIP-MCNC: NEGATIVE MG/DL
LEUKOCYTE ESTERASE UR QL STRIP: NEGATIVE
MUCOUS THREADS URNS QL MICRO: ABNORMAL
NITRITE UR QL STRIP: NEGATIVE
PH UR STRIP: 8.5 [PH] (ref 5–8)
PROT UR STRIP-MCNC: 30 MG/DL
RBC #/AREA URNS HPF: 1 /HPF (ref 0–2)
SP GR UR STRIP: 1.01 (ref 1–1.03)
UROBILINOGEN UR STRIP-ACNC: 0.2 EU/DL (ref 0–1)
WBC #/AREA URNS HPF: 1 /HPF (ref 0–5)

## 2025-02-02 PROCEDURE — 84702 CHORIONIC GONADOTROPIN TEST: CPT

## 2025-02-02 PROCEDURE — 83690 ASSAY OF LIPASE: CPT

## 2025-02-02 PROCEDURE — 99283 EMERGENCY DEPT VISIT LOW MDM: CPT

## 2025-02-02 PROCEDURE — 81001 URINALYSIS AUTO W/SCOPE: CPT

## 2025-02-02 PROCEDURE — 85025 COMPLETE CBC W/AUTO DIFF WBC: CPT

## 2025-02-02 PROCEDURE — 80053 COMPREHEN METABOLIC PANEL: CPT

## 2025-02-03 ENCOUNTER — HOSPITAL ENCOUNTER (OUTPATIENT)
Dept: PSYCHIATRY | Age: 20
Setting detail: THERAPIES SERIES
Discharge: HOME | End: 2025-02-03
Payer: COMMERCIAL

## 2025-02-03 VITALS
RESPIRATION RATE: 14 BRPM | OXYGEN SATURATION: 97 % | DIASTOLIC BLOOD PRESSURE: 69 MMHG | HEART RATE: 67 BPM | BODY MASS INDEX: 23 KG/M2 | HEIGHT: 62 IN | TEMPERATURE: 98.8 F | SYSTOLIC BLOOD PRESSURE: 110 MMHG | WEIGHT: 125 LBS

## 2025-02-03 LAB
ALBUMIN SERPL-MCNC: 4.3 G/DL (ref 3.4–5)
ALBUMIN/GLOB SERPL: 1.7 {RATIO} (ref 1.1–2.2)
ALP SERPL-CCNC: 46 U/L (ref 40–129)
ALT SERPL-CCNC: 10 U/L (ref 10–40)
ANION GAP SERPL CALCULATED.3IONS-SCNC: 10 MMOL/L (ref 9–17)
AST SERPL-CCNC: 21 U/L (ref 15–37)
B-HCG SERPL EIA 3RD IS-ACNC: <1 MIU/ML
BASOPHILS # BLD: 0.03 K/UL
BASOPHILS NFR BLD: 0 % (ref 0–1)
BILIRUB SERPL-MCNC: 0.4 MG/DL (ref 0–1)
BUN SERPL-MCNC: 15 MG/DL (ref 7–20)
CALCIUM SERPL-MCNC: 9.2 MG/DL (ref 8.3–10.6)
CHLORIDE SERPL-SCNC: 105 MMOL/L (ref 99–110)
CO2 SERPL-SCNC: 28 MMOL/L (ref 21–32)
CREAT SERPL-MCNC: 0.9 MG/DL (ref 0.6–1.1)
EOSINOPHIL # BLD: 0.26 K/UL
EOSINOPHILS RELATIVE PERCENT: 3 % (ref 0–3)
ERYTHROCYTE [DISTWIDTH] IN BLOOD BY AUTOMATED COUNT: 11.4 % (ref 11.7–14.9)
GFR, ESTIMATED: 76 ML/MIN/1.73M2
GLUCOSE SERPL-MCNC: 88 MG/DL (ref 74–99)
HCT VFR BLD AUTO: 39.8 % (ref 37–47)
HGB BLD-MCNC: 13.5 G/DL (ref 12.5–16)
IMM GRANULOCYTES # BLD AUTO: 0.01 K/UL
IMM GRANULOCYTES NFR BLD: 0 %
LIPASE SERPL-CCNC: 22 U/L (ref 13–60)
LYMPHOCYTES NFR BLD: 2.84 K/UL
LYMPHOCYTES RELATIVE PERCENT: 36 % (ref 24–44)
MCH RBC QN AUTO: 30.1 PG (ref 27–31)
MCHC RBC AUTO-ENTMCNC: 33.9 G/DL (ref 32–36)
MCV RBC AUTO: 88.6 FL (ref 78–100)
MONOCYTES NFR BLD: 0.58 K/UL
MONOCYTES NFR BLD: 7 % (ref 0–4)
NEUTROPHILS NFR BLD: 53 % (ref 36–66)
NEUTS SEG NFR BLD: 4.19 K/UL
PLATELET # BLD AUTO: 206 K/UL (ref 140–440)
PMV BLD AUTO: 10.7 FL (ref 7.5–11.1)
POTASSIUM SERPL-SCNC: 4.1 MMOL/L (ref 3.5–5.1)
PROT SERPL-MCNC: 6.9 G/DL (ref 6.4–8.2)
RBC # BLD AUTO: 4.49 M/UL (ref 4.2–5.4)
SODIUM SERPL-SCNC: 142 MMOL/L (ref 136–145)
WBC OTHER # BLD: 7.9 K/UL (ref 4–10.5)

## 2025-02-03 PROCEDURE — 90834 PSYTX W PT 45 MINUTES: CPT

## 2025-02-03 ASSESSMENT — LIFESTYLE VARIABLES
HOW MANY STANDARD DRINKS CONTAINING ALCOHOL DO YOU HAVE ON A TYPICAL DAY: PATIENT DOES NOT DRINK
HOW OFTEN DO YOU HAVE A DRINK CONTAINING ALCOHOL: NEVER

## 2025-02-03 ASSESSMENT — PAIN DESCRIPTION - LOCATION: LOCATION: ABDOMEN

## 2025-02-03 ASSESSMENT — PAIN SCALES - GENERAL: PAINLEVEL_OUTOF10: 2

## 2025-02-03 NOTE — ED PROVIDER NOTES
Triage Chief Complaint:   Illness (Pt has had flu like symptoms for 3 months, was seen at [facility] today due to peeling on her hands but they said nothing was wrong )    [place]:  Today in the ED I had the pleasure of caring for Roxana Moreau who is a 19 y.o. female that presents today to the ED for evaluation. Pt states over the past 3 months she has been experiencing intermittent lightheadedness, abdominal pain, sob, and abdominal pain. .    ROS:  REVIEW OF SYSTEMS    At least 10 systems reviewed      All other review of systems are negative  See HPI and nursing notes for additional information       No past medical history on file.  No past surgical history on file.  Family History   Problem Relation Age of Onset    No Known Problems Mother     Alcohol Abuse Father      Social History     Socioeconomic History    Marital status: Single     Spouse name: Not on file    Number of children: Not on file    Years of education: Not on file    Highest education level: Not on file   Occupational History    Not on file   Tobacco Use    Smoking status: Never    Smokeless tobacco: Not on file   Vaping Use    Vaping status: Every Day   Substance and Sexual Activity    Alcohol use: Not Currently    Drug use: Not Currently     Frequency: 1.0 times per week     Types: Marijuana (Weed)    Sexual activity: Not on file   Other Topics Concern    Not on file   Social History Narrative    Not on file     Social Determinants of Health     Financial Resource Strain: Not on file   Food Insecurity: Food Insecurity Present (1/27/2025)    Hunger Vital Sign     Worried About Running Out of Food in the Last Year: Sometimes true     Ran Out of Food in the Last Year: Sometimes true   Transportation Needs: Not on file   Physical Activity: Not on file   Stress: Not on file   Social Connections: Not on file   Intimate Partner Violence: Not on file   Housing Stability: Not on file     No current facility-administered medications for this
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
## 2025-02-05 ENCOUNTER — HOSPITAL ENCOUNTER (OUTPATIENT)
Dept: PSYCHIATRY | Age: 20
Setting detail: THERAPIES SERIES
Discharge: HOME | End: 2025-02-05
Payer: COMMERCIAL

## 2025-02-05 PROCEDURE — 90853 GROUP PSYCHOTHERAPY: CPT

## 2025-02-05 NOTE — GROUP NOTE
[facility] Group Therapy Note      2/5/2025    Location:  [place]      Clients Presents: 1343, 1725    Clients Absent: 1741, 1752    Length of session: 1.5 hours    Group Note: OP    Group Type: Women's    New members were welcomed and introduced.  Norms and expectations of group were discussed.    Content: Counselor presented a solution focused discussion on emotions, stress and avoiding relapse.     INGA LiraIII  2/5/2025 5:30 PM    Co-Therapist: N/A      [facility] Individual Group Progress Note    Roxana Moreau  2005 2/5/2025    Notes on Client Progress in Group    Client shared she is making progress on her goals. She denies any use or cravings. 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   Client participated in group discussion and shared sometimes she get down on herself and thinks negatively and it causes stress. She has started a new Bible reading jose carlos and is listening to positive Orthodox music.      INGA LiraIII  2/5/2025 5:34 PM    Co-Therapist: N/A

## 2025-02-10 ENCOUNTER — HOSPITAL ENCOUNTER (OUTPATIENT)
Dept: PSYCHIATRY | Age: 20
Setting detail: THERAPIES SERIES
Discharge: HOME | End: 2025-02-10
Payer: COMMERCIAL

## 2025-02-10 PROCEDURE — 90834 PSYTX W PT 45 MINUTES: CPT

## 2025-02-10 NOTE — PROGRESS NOTES
]=                                                        [facility]    \";/      Individual  Progress Note    Location: [x] [place] [] [place]                   Patient Name: Roxana Moreau   : 2005     Case # :  [id number]  Therapist: CASSIE Lira        Objective/Service/Time: kept 48 min individual     Goal 3 Objective 3 achieved    S-Client is a 19 year old  female on probation. Client denies any use or cravings. Client shared that things are going really well stating, \"I got all registered to [employer] classes. I am waiting for an orientation to open up. I had an interview at [employer] and they took a copy of my drivers license and social security card. I hope that means I got the job. I am nervous about checking in with my PO. I don't want to go to intermediate\". Client shared she over thinks, mind reads and doesn't feel loved as the biggest source of her anxiety. She stated, \"I have been reading my Bible and praying and I feel calmer. I have hope that I will get a job and my GED so I don't go to intermediate\".     O-Client was cooperative, pleasant and oriented x 4. She shared openly and honestly.     A-Client has good insight into her AoD use and consequences. She has very minimal support and is looking for ways to increase. Counselor has encouraged client to find a small group and join Orthodox to meet healthy support.     P-Client will continue services, continue to put out applications and interview.         Electronically signed by CASSIE Lira on 2/10/2025 at 11:13 AM
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
## 2025-02-12 ENCOUNTER — HOSPITAL ENCOUNTER (OUTPATIENT)
Dept: PSYCHIATRY | Age: 20
Setting detail: THERAPIES SERIES
Discharge: HOME | End: 2025-02-12
Payer: COMMERCIAL

## 2025-02-12 PROCEDURE — 90853 GROUP PSYCHOTHERAPY: CPT

## 2025-02-12 NOTE — GROUP NOTE
[facility] Group Therapy Note      2/12/2025    Location:  [place]      Clients Presents: 1741, 1725    Clients Absent: 1752, 1747    Length of session: 1.5 hours    Group Note: OP    Group Type: Women's    New members were welcomed and introduced.  Norms and expectations of group were discussed.    Content: Counselor facilitated a topic focused discussion on family backgrounds.     INGA LiraIII  2/12/2025 5:30 PM    Co-Therapist: N/A      [facility] Individual Group Progress Note    Roxana Moreau  2005 2/12/2025    Notes on Client Progress in Group    Client shared she is making progress on her goals in treatment. She denies any use or cravings. Client reports she got hired at [employer] and is very excited.   Client participated in group discussion on family background sharing she had a rough childhood and not many positive experiences.     INGA LiraIII  2/12/2025 5:12 PM    Co-Therapist: N/A

## 2025-02-17 ENCOUNTER — HOSPITAL ENCOUNTER (OUTPATIENT)
Dept: PSYCHIATRY | Age: 20
Setting detail: THERAPIES SERIES
Discharge: HOME | End: 2025-02-17
Payer: COMMERCIAL

## 2025-02-17 PROCEDURE — 90834 PSYTX W PT 45 MINUTES: CPT
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
## 2025-02-17 NOTE — PROGRESS NOTES
[facility]        Individual  Progress Note    Location: [x] [place] [] [place]                   Patient Name: Roxana Moreau   : 2005     Case # :  [id number]  Therapist: CASSIE Lira        Objective/Service/Time: kept 50 min individual     Goal 1 Objective Achieved  Goal 2 Objective 2 continue  Goal 3 Objective 2 continue    S-Client is a 19 year old  female on probation. Client denies any use or cravings. She reports she is starting work tomorrow and is excited to meet the people she will work with. She shared she hung out with 2 friends over the weekend and it was \"great\". She stated, \"We made heart shaped pizzas and talked. It was really nice that my friend asked if she smoked would it trigger me? I asked her not to smoke in the car with me\". Client reports she is using a new web page and practicing anxiety managing techniques. She also shared she is still utilizing positive affirmations daily. She denies any current stressors.     O-Client was cooperative, pleasant and oriented x 4. She shared her thoughts and feelings openly.    A-Client has good insight into her AoD use and consequences. She is in the action stage of change. Client is build sober support and is now employed. Counselor has encouraged client to continue with journaling and positive affirmations.     P-Continue services.         Electronically signed by CASSIE Lira on 2025 at 10:52 AM

## 2025-02-19 ENCOUNTER — HOSPITAL ENCOUNTER (OUTPATIENT)
Dept: PSYCHIATRY | Age: 20
Setting detail: THERAPIES SERIES
Discharge: HOME | End: 2025-02-19
Payer: COMMERCIAL

## 2025-02-19 PROCEDURE — 90853 GROUP PSYCHOTHERAPY: CPT

## 2025-02-19 NOTE — GROUP NOTE
[facility] Group Therapy Note      2/19/2025    Location:  [place]      Clients Presents: 1741, 1752, 1725, 1577    Clients Absent: [id number]    Length of session: 1.5 hours    Group Note: OP    Group Type: Women's    New members were welcomed and introduced.  Norms and expectations of group were discussed.    Content: Counselor presented a topic focused discussion on 4 unhealthy communication styles.    INGA LiraIII  2/19/2025 5:30 PM    Co-Therapist: N/A      [facility] Individual Group Progress Note    Roxana Moreau  2005 2/19/2025    Notes on Client Progress in Group    Client shared she is making progress on her treatment goals. She is remaining sober and has to fill out her paperwork at [employer] today. Denies any current stressors.   Client participated in group discussion and gave appropriate feedback and support to her peers.     INAG LiraIII  2/19/2025 5:30 PM    Co-Therapist: N/A

## 2025-02-24 ENCOUNTER — HOSPITAL ENCOUNTER (OUTPATIENT)
Dept: PSYCHIATRY | Age: 20
Setting detail: THERAPIES SERIES
Discharge: HOME | End: 2025-02-24
Payer: COMMERCIAL

## 2025-02-24 PROCEDURE — 80305 DRUG TEST PRSMV DIR OPT OBS: CPT

## 2025-02-24 PROCEDURE — 90834 PSYTX W PT 45 MINUTES: CPT

## 2025-02-24 NOTE — PROGRESS NOTES
[facility]        Individual  Progress Note    Location: [x] [place] [] [place]                   Patient Name: Roxana Moreau   : 2005     Case # :  [id number]  Therapist: CASSIE Lira        Objective/Service/Time: kept 50 min individual     Goal 2 Objective 1 continue    S-Client is a 19 year old  female on probation. Client denies any use or cravings. She shared [employer] still has not got her an employee number so she can start orientation. Client stated, \"I am not sure what the problem is? I call and the manager still has not got me a number. I am not stressing about it. I am not in control\". Client shared she is still hanging out with her new friend that smokes THC and she stated, 'I am not triggered or anything. I just repeat I am not smoking. I am sober. It feels good\". Client shared the longer she is sober the more she wants to remain sober.    O-Client was pleasant, cooperative and oriented x 4.     A-Client has good insight into her AoD use and consequences. She has little support network and is building more. Client is in the action stage of change. Counselor encouraged her to attend 12 step meetings womens group to build sober support.     P-Continue services, build support and call employer for employee number.         Electronically signed by CASSIE Lira on 2025 at 10:37 AM
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
## 2025-02-26 ENCOUNTER — HOSPITAL ENCOUNTER (OUTPATIENT)
Dept: PSYCHIATRY | Age: 20
Setting detail: THERAPIES SERIES
Discharge: HOME | End: 2025-02-26
Payer: COMMERCIAL

## 2025-02-26 PROCEDURE — 90853 GROUP PSYCHOTHERAPY: CPT

## 2025-02-26 NOTE — PROGRESS NOTES
[facility] TREATMENT PLAN      Location: [x] [place] [] [place]    Treatment plan: Extended Care    Strengths: Support from family    Weakness/Limitations: Myself-Get in my own head    Service/Frequency/Duration: Case Management as needed    Diagnosis: F12.10 Nondependent cannabis abuse-unspecified use    Level of Care: 1 Outpatient Services    Problem: Lack of food resources   Goal: Apply for SNAP x 90 days   Objectives:   1) Complete application for SNAP x 90 days Evaluation Date: 5/24/25 Code: C Continue TBD  2) Provide Income verification documents to [employer] x 90 days Evaluation Date: 5/24/25 Code: C Continue TBD  3) Provide letter describing living arrangements to [employer] x 90 days Evaluation Date: 5/4/25 Code: C Continue TBD    Defer: Client reports she would like to have her own housing    Discharge Plan/Instructions: Follow through with case management recommendations    Roxana Moreau / 2005 has participated in the treatment plan development outlined above on 2/24/25.     Salina Yoder LCDCIII  2/26/2025/8:29 AM

## 2025-02-26 NOTE — GROUP NOTE
[facility] Group Therapy Note      2/26/2025    Location:  [place]      Clients Presents: 1741, 1725, 157    Clients Absent: [id number], [id number]    Length of session: 1.5 hours    Group Note: OP    Group Type: Women's    New members were welcomed and introduced.  Norms and expectations of group were discussed.    Content: Counselor presented a topic focused discussion on \"am I an addict?\" Client answered 29 yes & no questions from the NA pamphlet.     CASSIE Lira  2/26/2025 5:30 PM    Co-Therapist: N/A      [facility] Individual Group Progress Note    Roxana Moreau  2005 2/26/2025    Notes on Client Progress in Group    Client shared she is making progress on her treatment goals. She denies any current stressors. Denies any use or cravings.   Client participated in group discussion on \"am I an addict?\" She answered 23 out of 29 yes. She shared she is accepting of her past and looking forward to a sober future.     CASSIE Lira  2/26/2025 5:24 PM    Co-Therapist: N/A

## 2025-03-03 ENCOUNTER — HOSPITAL ENCOUNTER (OUTPATIENT)
Dept: PSYCHIATRY | Age: 20
Setting detail: THERAPIES SERIES
Discharge: HOME | End: 2025-03-03
Payer: COMMERCIAL

## 2025-03-03 PROCEDURE — 90834 PSYTX W PT 45 MINUTES: CPT

## 2025-03-03 NOTE — PROGRESS NOTES
[facility] TREATMENT PLAN      Location: [x] [place] [] [place]    Treatment plan: Extended Care    Strengths: Support from family    Weakness/Limitations: Myself-Get in my own head    Service/Frequency/Duration: Case Management as needed    Diagnosis: F12.10 Nondependent cannabis abuse-unspecified use    Level of Care: 1 Outpatient Services    Problem: Lack of food resources   Goal: Apply for SNAP x 90 days   Objectives:   1) Complete application for SNAP x 90 days Evaluation Date: 5/24/25 Code: Achieved 3/3/25 Client reviewed and signed application: Emailed application and documentation to [facility]  2) Provide Income verification documents to [facility] x 90 days Evaluation Date: 5/24/25 Code:Achieved 3/3/25 Client reviewed and signed application: Client does not have income at this time.  Emailed application and documentation to [facility]   3) Provide letter describing living arrangements to [facility] x 90 days Evaluation Date: 5/4/25 Code: Achieved 3/3/25 Client reviewed and signed application: Client reviewed and signed letter about living arrangements.  Emailed application and documentation to [facility]    Defer: Client reports she would like to have her own housing    Discharge Plan/Instructions: Follow through with case management recommendations    Roxana Moreau / 2005 has participated in the treatment plan development outlined above on 2/24/25.     INGA CoreaIII  3/3/2025/11:38 AM

## 2025-03-03 NOTE — PROGRESS NOTES
[facility]        Individual  Progress Note    Location: [x] [place] [] [place]                   Patient Name: Roxana Moreau   : 2005     Case # :  [id number]  Therapist: CASSIE Lira        Objective/Service/Time: kept 55 min individual     Goal 3 Objective 1&2 achieved    S-client is a 19 year old  female on probation. Client denies any use or cravings. She shared she is feeling frustrated due to [employer] not contacting her with her employee information. Client stated, \"I will call again today but I am frustrated and will probably look for another job soon if I can't start\". Client shared she is talking with sober support daily and went out to eat with her friend Clair. Client has good coping skills reporting working out, writing in a journal and listening to positive music. Client denies any obstacles for her recovery and shared she never wants to go back to marijuana and \"check out mentally\".    O-Client was cooperative, pleasant and oriented x 4. She shared openly and honestly.     A-Client has good insight into her AoD use and consequences. Client is in the action stage of change. Client has small sober support system but lives with toxic negative family. She is working on being employed and has goal of saving money to move.     P-Continue with last group on 3/5/2025.         Electronically signed by CASSIE Lira on 3/3/2025 at 10:53 AM

## 2025-03-05 ENCOUNTER — HOSPITAL ENCOUNTER (OUTPATIENT)
Dept: PSYCHIATRY | Age: 20
Setting detail: THERAPIES SERIES
Discharge: HOME | End: 2025-03-05
Payer: COMMERCIAL

## 2025-03-05 PROCEDURE — 90853 GROUP PSYCHOTHERAPY: CPT

## 2025-03-05 NOTE — GROUP NOTE
[facility] Group Therapy Note      3/5/2025    Location:  [place]      Clients Presents: 1741, 1752, 1725, 1577, 1776    Clients Absent:     Length of session: 1.5 hours    Group Note: OP    Group Type: Women's    New members were welcomed and introduced.  Norms and expectations of group were discussed.    Content: Counselor presented a solution focused discussion on Stress. Client identified how stress feels in her body and what causes her stress. Client identified coping skills to manage stress.     CASSIE Lira  3/5/2025 5:28 PM    Co-Therapist: N/A      [facility] Individual Group Progress Note    Roxana Moreau  2005  3/5/2025    Notes on Client Progress in Group    Client shared she has made great progress on her goals. She reports [employer] job fell through and she was upset but is going to start looking again.   Client participated in group discussion on stress. She provided feedback and support to her peers.      INGA LiraIII  3/5/2025 5:30 PM    Co-Therapist: N/A

## 2025-03-10 ENCOUNTER — HOSPITAL ENCOUNTER (OUTPATIENT)
Dept: PSYCHIATRY | Age: 20
Setting detail: THERAPIES SERIES
Discharge: HOME | End: 2025-03-10
Payer: COMMERCIAL

## 2025-03-10 PROCEDURE — 90834 PSYTX W PT 45 MINUTES: CPT

## 2025-03-10 NOTE — PROGRESS NOTES
[facility] Discharge Summary    Roxana Moreau  2005  Case # [id number]    Location: [x] [place] [] [place]    Admission Date: 11/11/2024    Date of last service: 3/10/2025    Therapist: CASSIE Lira     Presenting Problem  Client is on probation for an obstruction charge.    Last drug screen: 2/24/2025  Results: Negative    Diagnosis: F12.10 Nondependent cannabis abuse-unspecified use and F10.99 Unspecified alcohol-related disorder         Service:   assessment,   Individual 1 a week, Group assigned 1 a week, Urinalysis Random monthly , AA/NA 1-2 biweekly, and Case Management as needed    Level of care at ADMISSIONS: 1 Outpatient Services    Level of care at DISCHARGE : NA    Client's Outcomes Treatment:    Successful completion.    Service History Appointments: 32 Scheduled 31 Kept 0 Cancelled 1 Did not show    Discharge Code: B completed treatment program    Reason for discharge: Client completed her treatment plan goals and objectives.     Recommendations/Referrals: Client is being referred back to probation. She is encouraged to remain abstinent and contact [facility] if she needs help in the future.     If upon involuntary termination from service, client has received Client Rights as to their right to file an appeal.    Adult/Adolescent Discharge  Level of Care Criteria to be completed separately see attached form      CASSIE Lira   3/10/2025 / 2:20 PM       Medical Director     ANIBAL Caldwell MD    Signature:
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
## 2025-03-10 NOTE — PROGRESS NOTES
[facility] Discharge Treatment Plan    Roxana Moreau  2005  Case # [id number]    Location: [x] [place] [] [place]    A. Stresses that I need to monitor  1.  Relationships   2.  Probation   3. Getting a job  4.     B. Major triggers to using alcohol/drugs   1. Depression    2.  Anger   3.      Sober Plan   1. Get a job   2. Study for GED   3. Listen to music     C. Sobriety Support   1. Friend:  Muna   2. Treatment staff:  [facility] staff  3. Family member:  Aunt and Uncle  4. AA/NA recovery program, sponsor, meetings day/times, daily ready: [employer] music and online meetings.     D. Non-using activities  1. Doing make up    2. Crafting    3. Writing in journal    E. Consequences of Drug/Alcohol use  1. MCFP   2. Losing time    3. Going through withdrawals     G. Short term goals to achieve  1.  Client would like to find a job   2. Client would like to get her GED and drivers permit     H. Follow up recommendations  1. Client is encouraged to continue to remain sober and build support.   2. Client is encouraged to contact [facility] if she needs help in the future.    Roxana YANNICK Prieto / 2005 has participated in the discharge treatment plan development outlined above on 3/10/2025.     INGA LiraIII  3/10/2025/1:37 PM

## 2025-03-12 ENCOUNTER — APPOINTMENT (OUTPATIENT)
Dept: PSYCHIATRY | Age: 20
End: 2025-03-12
Payer: COMMERCIAL

## 2025-03-17 ENCOUNTER — APPOINTMENT (OUTPATIENT)
Dept: PSYCHIATRY | Age: 20
End: 2025-03-17
Payer: COMMERCIAL

## 2025-03-19 ENCOUNTER — APPOINTMENT (OUTPATIENT)
Dept: PSYCHIATRY | Age: 20
End: 2025-03-19
Payer: COMMERCIAL

## 2025-03-24 ENCOUNTER — APPOINTMENT (OUTPATIENT)
Dept: PSYCHIATRY | Age: 20
End: 2025-03-24
Payer: COMMERCIAL

## 2025-03-26 ENCOUNTER — APPOINTMENT (OUTPATIENT)
Dept: PSYCHIATRY | Age: 20
End: 2025-03-26
Payer: COMMERCIAL

## 2025-03-31 ENCOUNTER — APPOINTMENT (OUTPATIENT)
Dept: PSYCHIATRY | Age: 20
End: 2025-03-31
Payer: COMMERCIAL

## 2025-04-02 ENCOUNTER — APPOINTMENT (OUTPATIENT)
Dept: PSYCHIATRY | Age: 20
End: 2025-04-02
Payer: COMMERCIAL

## 2025-04-04 ENCOUNTER — HOSPITAL ENCOUNTER (OUTPATIENT)
Dept: PSYCHIATRY | Age: 20
Setting detail: THERAPIES SERIES
Discharge: HOME | End: 2025-04-04
Payer: COMMERCIAL

## 2025-04-04 PROCEDURE — 99484 CARE MGMT SVC BHVL HLTH COND: CPT

## 2025-04-04 NOTE — PROGRESS NOTES
Kenna COREA        Individual  Progress Note    Location: [x] [place] [] [place]                   Patient Name: Roxana Moreau   : 2005     Case # :  [id number]  Therapist: CASSIE Corea        Objective/Service/Time:   CASE MANAGEMENT  10:30-10:50    S:  I spoke with client about Medicaid/ SNAP application.  Client stated she received the phone call to complete her interview but was on hold too long and had to hang up due to other obligation.  I spoke with [employer] and rescheduled interview for 4/10/25 between 8 am -11 am.  [employer] explained that if client is approved, client will not be receiving benefits for March, but will receive April.  I explained to client to program [phone] in phone, so call is not blocked.      O:  Client was oriented and open to discussion.    A:  Client was provided rescheduled appointment date for Medicaid/ SNAP.       P:  I will continue to follow up with client and [employer] as needed.  Client will   continue to speak with Therapist or  about requests for assistance if needed.    NOELLE Corea, INGA III, CTTS        Electronically signed by CASSIE Corea on 2025 at 1:17 PM

## 2025-04-07 ENCOUNTER — APPOINTMENT (OUTPATIENT)
Dept: PSYCHIATRY | Age: 20
End: 2025-04-07
Payer: COMMERCIAL